# Patient Record
Sex: FEMALE | Race: ASIAN | NOT HISPANIC OR LATINO | Employment: UNEMPLOYED | URBAN - METROPOLITAN AREA
[De-identification: names, ages, dates, MRNs, and addresses within clinical notes are randomized per-mention and may not be internally consistent; named-entity substitution may affect disease eponyms.]

---

## 2018-11-06 NOTE — PATIENT DISCUSSION
Based on today’s exam, diagnostic studies, and/or review of records, the determination was to see a retinal specialist for an eval.

## 2018-11-07 NOTE — PATIENT DISCUSSION
Lucentis 0.5mg injection recommended today after discussion of benefits, risks, alternatives. The injection was administered without complication. Post-injection instructions were reviewed and understood by the patient.

## 2018-11-07 NOTE — PATIENT DISCUSSION
Decision to treat was made based on today's clinical findings.  SRF present on testing/exam today.  Recommend Lucentis 0.5mg injection today.  Follow up in 4-5 weeks for repeat injection.

## 2018-11-07 NOTE — PROCEDURE NOTE: CLINICAL
PROCEDURE NOTE: Lucentis 0.5 mg OS. Diagnosis: Neovascular AMD with Active CNV. Anesthesia: Topical. Prep: Betadine Drops and Scrubs. Prior to injection, risks/benefits/alternatives discussed including infection, loss of vision, hemorrhage, cataract, glaucoma, retinal tears or detachment and patient wished to proceed. Informed consent obtained. . Patient was advised the purpose of the treatment was to slow the progression of the disease, and may not improve visual acuity. Betadine prep was performed. Injection site: 3-4 mm from the limbus. Mask worn during procedure. A lid speculum was used. Intravitreal injection of Lucentis 0.5mg/0.05 ml was given. Discarded remaining *. CRA perfusion confirmed. The eye was irrigated with sterile eye rinse solution. The betadine was washed away. Count fingers vision was verified. The patient tolerated the procedure well and there were no complications from the procedure. Post procedure instructions given. Patient given office phone number/answering service number and advised to call immediately should there be an increase in floaters or redness, loss of vision or pain, or should they have any other questions or concerns. Patient was given the standard instruction sheet. Cecil Escobedo

## 2018-12-10 NOTE — PROCEDURE NOTE: CLINICAL
PROCEDURE NOTE: Lucentis 0.5 mg OS. Diagnosis: Neovascular AMD with Active CNV. Prior to injection, risks/benefits/alternatives discussed including infection, loss of vision, hemorrhage, cataract, glaucoma, retinal tears or detachment and patient wished to proceed. Informed consent obtained. . Patient was advised the purpose of the treatment was to slow the progression of the disease, and may not improve visual acuity. Betadine prep was performed. Injection site: 3-4 mm from the limbus. Mask worn during procedure. A lid speculum was used. Intravitreal injection of Lucentis 0.5mg/0.05 ml was given. Discarded remaining *. CRA perfusion confirmed. The eye was irrigated with sterile eye rinse solution. The betadine was washed away. Count fingers vision was verified. The patient tolerated the procedure well and there were no complications from the procedure. Post procedure instructions given. Patient given office phone number/answering service number and advised to call immediately should there be an increase in floaters or redness, loss of vision or pain, or should they have any other questions or concerns. Patient was given the standard instruction sheet. Melinda Bhakta

## 2018-12-10 NOTE — PATIENT DISCUSSION
Decision to treat was made based on today's clinical findings.  Decreased SRF present on testing/exam today, Improved.  Recommend Lucentis 0.5mg injection today.  Follow up in one month for possible repeat injection.

## 2019-01-18 NOTE — PROCEDURE NOTE: CLINICAL
PROCEDURE NOTE: Lucentis 0.5 mg OS. Diagnosis: Neovascular AMD with Active CNV. Prior to injection, risks/benefits/alternatives discussed including infection, loss of vision, hemorrhage, cataract, glaucoma, retinal tears or detachment and patient wished to proceed. Informed consent obtained. . Patient was advised the purpose of the treatment was to slow the progression of the disease, and may not improve visual acuity. Betadine prep was performed. Injection site: 3-4 mm from the limbus. Mask worn during procedure. A lid speculum was used. Intravitreal injection of Lucentis 0.5mg/0.05 ml was given. Discarded remaining *. CRA perfusion confirmed. The eye was irrigated with sterile eye rinse solution. The betadine was washed away. Count fingers vision was verified. The patient tolerated the procedure well and there were no complications from the procedure. Post procedure instructions given. Patient given office phone number/answering service number and advised to call immediately should there be an increase in floaters or redness, loss of vision or pain, or should they have any other questions or concerns. Patient was given the standard instruction sheet. Gavin Damon

## 2019-01-18 NOTE — PATIENT DISCUSSION
Decision to treat was made based on today's clinical findings.  Decreased SRF, present on testing/exam today, Improved.  Recommend Lucentis 0.5mg injection today. Extend to 6-7 weeks for possible repeat injection.

## 2019-03-01 NOTE — PROCEDURE NOTE: CLINICAL
PROCEDURE NOTE: Lucentis 0.5 mg OS. Diagnosis: Neovascular AMD with Active CNV. Prior to injection, risks/benefits/alternatives discussed including infection, loss of vision, hemorrhage, cataract, glaucoma, retinal tears or detachment and patient wished to proceed. Informed consent obtained. . Patient was advised the purpose of the treatment was to slow the progression of the disease, and may not improve visual acuity. Betadine prep was performed. Injection site: 3-4 mm from the limbus. Mask worn during procedure. A lid speculum was used. Intravitreal injection of Lucentis 0.5mg/0.05 ml was given. Discarded remaining *. CRA perfusion confirmed. The eye was irrigated with sterile eye rinse solution. The betadine was washed away. Count fingers vision was verified. The patient tolerated the procedure well and there were no complications from the procedure. Post procedure instructions given. Patient given office phone number/answering service number and advised to call immediately should there be an increase in floaters or redness, loss of vision or pain, or should they have any other questions or concerns. Patient was given the standard instruction sheet. Mary Jo Wilde

## 2019-03-01 NOTE — PATIENT DISCUSSION
Decision to treat was made based on today's clinical findings.  Decreased SRF, present on testing/exam today, Improved.  Recommend Lucentis 0.5mg injection today. Patient to return in 5-6 weeks, possible repeat injection at that time.

## 2019-04-05 NOTE — PATIENT DISCUSSION
Decision to treat was made based on today's clinical findings.  Ped with Decreased SRF, present on testing/exam today, Improved.  Recommend Lucentis 0.5mg injection today.  Treat and extend, patient to return in 6-7 weeks, possible repeat injection at that time.

## 2019-04-05 NOTE — PROCEDURE NOTE: CLINICAL
PROCEDURE NOTE: Lucentis 0.5 mg OS. Diagnosis: Neovascular AMD with Active CNV. Prior to injection, risks/benefits/alternatives discussed including infection, loss of vision, hemorrhage, cataract, glaucoma, retinal tears or detachment and patient wished to proceed. Informed consent obtained. . Patient was advised the purpose of the treatment was to slow the progression of the disease, and may not improve visual acuity. Betadine prep was performed. Injection site: 3-4 mm from the limbus. Mask worn during procedure. A lid speculum was used. Intravitreal injection of Lucentis 0.5mg/0.05 ml was given. Discarded remaining *. CRA perfusion confirmed. The eye was irrigated with sterile eye rinse solution. The betadine was washed away. Count fingers vision was verified. The patient tolerated the procedure well and there were no complications from the procedure. Post procedure instructions given. Patient given office phone number/answering service number and advised to call immediately should there be an increase in floaters or redness, loss of vision or pain, or should they have any other questions or concerns. Patient was given the standard instruction sheet. Danis Webb

## 2019-05-22 NOTE — PROCEDURE NOTE: CLINICAL
PROCEDURE NOTE: Lucentis 0.5 mg OS. Diagnosis: Neovascular AMD with Active CNV. Prior to injection, risks/benefits/alternatives discussed including infection, loss of vision, hemorrhage, cataract, glaucoma, retinal tears or detachment and patient wished to proceed. Informed consent obtained. . Patient was advised the purpose of the treatment was to slow the progression of the disease, and may not improve visual acuity. Betadine prep was performed. Injection site: 3-4 mm from the limbus. Mask worn during procedure. A lid speculum was used. Intravitreal injection of Lucentis 0.5mg/0.05 ml was given. Discarded remaining *. CRA perfusion confirmed. The eye was irrigated with sterile eye rinse solution. The betadine was washed away. Count fingers vision was verified. The patient tolerated the procedure well and there were no complications from the procedure. Post procedure instructions given. Patient given office phone number/answering service number and advised to call immediately should there be an increase in floaters or redness, loss of vision or pain, or should they have any other questions or concerns. Patient was given the standard instruction sheet. Nino Russo

## 2019-05-22 NOTE — PATIENT DISCUSSION
Decision to treat was made based on today's clinical findings.  Increased IRF, worse.  Continue with injection today, decrease interval to  5-6 weeks for possible repeat injection.

## 2019-06-26 NOTE — PROCEDURE NOTE: CLINICAL
PROCEDURE NOTE: Lucentis 0.5 mg OS. Diagnosis: Neovascular AMD with Active CNV. Prior to injection, risks/benefits/alternatives discussed including infection, loss of vision, hemorrhage, cataract, glaucoma, retinal tears or detachment and patient wished to proceed. Informed consent obtained. . Patient was advised the purpose of the treatment was to slow the progression of the disease, and may not improve visual acuity. Betadine prep was performed. Injection site: 3-4 mm from the limbus. Mask worn during procedure. A lid speculum was used. Intravitreal injection of Lucentis 0.5mg/0.05 ml was given. Discarded remaining *. CRA perfusion confirmed. The eye was irrigated with sterile eye rinse solution. The betadine was washed away. Count fingers vision was verified. The patient tolerated the procedure well and there were no complications from the procedure. Post procedure instructions given. Patient given office phone number/answering service number and advised to call immediately should there be an increase in floaters or redness, loss of vision or pain, or should they have any other questions or concerns. Patient was given the standard instruction sheet. Shital Caruso

## 2019-06-26 NOTE — PATIENT DISCUSSION
Discussed in detail re: nature of condition, dry vs wet AMD, AREDS.  Decreased PED, improved. No evidence of wet conversion.

## 2019-06-26 NOTE — PATIENT DISCUSSION
Decision to treat was made based on today's clinical findings. Decreased IRF, improved.  Continue with injection today, patient to return in  5-6 weeks for possible repeat injection.

## 2019-08-12 NOTE — PATIENT DISCUSSION
Discussed in detail re: nature of condition, dry vs wet AMD, AREDS. PED, no SRF/IRF. No evidence of wet conversion.

## 2019-08-12 NOTE — PATIENT DISCUSSION
Decision to treat was made based on today's clinical findings. Increased IRF, worse.  Continue with injection today, decrease interval to  5 weeks for possible repeat injection.

## 2019-08-12 NOTE — PROCEDURE NOTE: CLINICAL
PROCEDURE NOTE: Lucentis 0.5 mg OS. Diagnosis: Neovascular AMD with Active CNV. Prior to injection, risks/benefits/alternatives discussed including infection, loss of vision, hemorrhage, cataract, glaucoma, retinal tears or detachment and patient wished to proceed. Informed consent obtained. . Patient was advised the purpose of the treatment was to slow the progression of the disease, and may not improve visual acuity. Betadine prep was performed. Injection site: 3-4 mm from the limbus. Mask worn during procedure. A lid speculum was used. Intravitreal injection of Lucentis 0.5mg/0.05 ml was given. Discarded remaining *. CRA perfusion confirmed. The eye was irrigated with sterile eye rinse solution. The betadine was washed away. Count fingers vision was verified. The patient tolerated the procedure well and there were no complications from the procedure. Post procedure instructions given. Patient given office phone number/answering service number and advised to call immediately should there be an increase in floaters or redness, loss of vision or pain, or should they have any other questions or concerns. Patient was given the standard instruction sheet. Roxy Dove

## 2019-09-16 NOTE — PATIENT DISCUSSION
Discussed in detail re: nature of condition, dry vs wet AMD, AREDS. Increased Atrophy/PED. No evidence of wet conversion.

## 2019-09-16 NOTE — PROCEDURE NOTE: CLINICAL
PROCEDURE NOTE: Eylea 2mg OS. Diagnosis: Neovascular AMD with Active CNV. Prior to injection, risks/benefits/alternatives discussed including corneal abrasion, infection, loss of vision, hemorrhage, cataract, glaucoma, retinal tears or detachment. A written consent is on file, and the need for today’s injection was discussed and the patient is understanding and wishes to proceed. The entire vial of Eylea was drawn up into a syringe. The opened vial, remaining drug, and filtered needle were disposed of in a certified biohazard container. Betadine prep was performed. Topical anesthesia was induced with Alcaine. 4% lidocaine pledge. A lid speculum was used. A short 30g needle on a 1cc syringe was used with product that that had previously been prepared under sterile conditions. Injection site: 3-4 mm from the limbus. The used syringe/needle was transferred to a biohazard container. Lid speculum removed. Mask worn during procedure. Patient tolerated procedure well. Count fingers vision was verified. There were no complications. Patient was given the standard instruction sheet. Patient given office phone number/answering service number and advised to call immediately should there be loss of vision or pain, or should they have any other questions or concerns. Bob Moore

## 2019-09-16 NOTE — PATIENT DISCUSSION
Decision to treat was made based on today's clinical findings. Increased PED/SRF, worse, will switch to [facility] today.

## 2019-10-21 NOTE — PATIENT DISCUSSION
No Evidence of WET AMD, No Progression, No Fluid. Discussed in detail re: nature of condition, dry vs wet AMD, AREDS.

## 2019-10-21 NOTE — PROCEDURE NOTE: CLINICAL
PROCEDURE NOTE: Eylea 2mg OS. Diagnosis: Neovascular AMD with Active CNV. Prior to injection, risks/benefits/alternatives discussed including corneal abrasion, infection, loss of vision, hemorrhage, cataract, glaucoma, retinal tears or detachment. A written consent is on file, and the need for today’s injection was discussed and the patient is understanding and wishes to proceed. The entire vial of Eylea was drawn up into a syringe. The opened vial, remaining drug, and filtered needle were disposed of in a certified biohazard container. Betadine prep was performed. Topical anesthesia was induced with Alcaine. 4% lidocaine pledge. A lid speculum was used. A short 30g needle on a 1cc syringe was used with product that that had previously been prepared under sterile conditions. Injection site: 3-4 mm from the limbus. The used syringe/needle was transferred to a biohazard container. Lid speculum removed. Mask worn during procedure. Patient tolerated procedure well. Count fingers vision was verified. There were no complications. Patient was given the standard instruction sheet. Patient given office phone number/answering service number and advised to call immediately should there be loss of vision or pain, or should they have any other questions or concerns. Debby Begum

## 2019-11-22 NOTE — PATIENT DISCUSSION
No Evidence of WET AMD, No Progression, No Fluid. Discussed in detail re: nature of condition, dry vs wet AMD, AREDS.  No progression to wet, ped, no fluid.

## 2019-11-22 NOTE — PATIENT DISCUSSION
Decision to treat was made based on today's clinical findings.  Decreased PED, no fluid, improved. Extend to 6 weeks.

## 2019-11-22 NOTE — PROCEDURE NOTE: CLINICAL
PROCEDURE NOTE: Eylea 2mg OS. Diagnosis: Neovascular AMD with Active CNV. Prior to injection, risks/benefits/alternatives discussed including corneal abrasion, infection, loss of vision, hemorrhage, cataract, glaucoma, retinal tears or detachment. A written consent is on file, and the need for today’s injection was discussed and the patient is understanding and wishes to proceed. The entire vial of Eylea was drawn up into a syringe. The opened vial, remaining drug, and filtered needle were disposed of in a certified biohazard container. Betadine prep was performed. Topical anesthesia was induced with Alcaine. 4% lidocaine pledge. A lid speculum was used. A short 30g needle on a 1cc syringe was used with product that that had previously been prepared under sterile conditions. Injection site: 3-4 mm from the limbus. The used syringe/needle was transferred to a biohazard container. Lid speculum removed. Mask worn during procedure. Patient tolerated procedure well. Count fingers vision was verified. There were no complications. Patient was given the standard instruction sheet. Patient given office phone number/answering service number and advised to call immediately should there be loss of vision or pain, or should they have any other questions or concerns. Robb Trinh

## 2019-12-02 NOTE — PATIENT DISCUSSION
History of Scleritis/Episcleritis OU,  Resolving OD,  No pain OD.  Work up negative.  Last episode was in 2012.  Follow up in 2 weeks.

## 2019-12-16 NOTE — PATIENT DISCUSSION
Resolved, History of Scleritis/Episcleritis OU,  No pain OD.  Work up negative.  Last episode was in 2012.

## 2020-01-06 NOTE — PATIENT DISCUSSION
Decreased PED, improved. Eylea injection recommended today after discussion of benefits, risks, alternatives, and off-label use. The injection was administered without complication. Post-injection instructions were reviewed and understood by the patient.

## 2020-01-06 NOTE — PROCEDURE NOTE: CLINICAL
PROCEDURE NOTE: Eylea 2mg OS. Diagnosis: Neovascular AMD with Active CNV. Prior to injection, risks/benefits/alternatives discussed including corneal abrasion, infection, loss of vision, hemorrhage, cataract, glaucoma, retinal tears or detachment. A written consent is on file, and the need for today’s injection was discussed and the patient is understanding and wishes to proceed. The entire vial of Eylea was drawn up into a syringe. The opened vial, remaining drug, and filtered needle were disposed of in a certified biohazard container. Betadine prep was performed. Topical anesthesia was induced with Alcaine. 4% lidocaine pledge. A lid speculum was used. A short 30g needle on a 1cc syringe was used with product that that had previously been prepared under sterile conditions. Injection site: 3-4 mm from the limbus. The used syringe/needle was transferred to a biohazard container. Lid speculum removed. Mask worn during procedure. Patient tolerated procedure well. Count fingers vision was verified. There were no complications. Patient was given the standard instruction sheet. Patient given office phone number/answering service number and advised to call immediately should there be loss of vision or pain, or should they have any other questions or concerns. Christiano Cook

## 2020-02-24 NOTE — PROCEDURE NOTE: CLINICAL
PROCEDURE NOTE: Eylea Prefilled Syringe 2mg OS. Diagnosis: Neovascular AMD with Active CNV. Prior to injection, risks/benefits/alternatives discussed including corneal abrasion, infection, loss of vision, hemorrhage, cataract, glaucoma, retinal tears or detachment. A written consent is on file, and the need for today's injection was discussed and the patient is understanding and wishes to proceed. A 30G needle was placed on an Eylea 2mg/0.05ml Pre-filled Syringe. Betadine prep was performed. Topical anesthesia was induced with Alcaine. 4% lidocaine pledge. A lid speculum was used. An intravitreal injection of Eylea was given. Injection site: 3-4 mm from the limbus. The used syringe/needle was transferred to a biohazard container. Lid speculum removed. Mask worn during procedure. Patient tolerated procedure well. Count fingers vision was verified. There were no complications. Patient was given the standard instruction sheet. Tanisha Castaneda

## 2020-02-24 NOTE — PATIENT DISCUSSION
Discussed in detail re: nature of condition, dry vs wet AMD, AREDS.  Increased PED, no conversion to wet.

## 2020-02-24 NOTE — PATIENT DISCUSSION
Decision to treat was made based on today's clinical findings.  Increased PED, no fluid, worse, continue with injection today.

## 2020-04-07 NOTE — PATIENT DISCUSSION
Discussed in detail re: nature of condition, dry vs wet AMD, AREDS.  Decreased PED, no conversion to wet.

## 2020-04-07 NOTE — PROCEDURE NOTE: CLINICAL
PROCEDURE NOTE: Eylea Prefilled Syringe 2mg OS. Diagnosis: Neovascular AMD with Active CNV. Prior to injection, risks/benefits/alternatives discussed including corneal abrasion, infection, loss of vision, hemorrhage, cataract, glaucoma, retinal tears or detachment. A written consent is on file, and the need for today's injection was discussed and the patient is understanding and wishes to proceed. A 30G needle was placed on an Eylea 2mg/0.05ml Pre-filled Syringe. Betadine prep was performed. Topical anesthesia was induced with Alcaine. 4% lidocaine pledge. A lid speculum was used. An intravitreal injection of Eylea was given. Injection site: 3-4 mm from the limbus. The used syringe/needle was transferred to a biohazard container. Lid speculum removed. Mask worn during procedure. Patient tolerated procedure well. Count fingers vision was verified. There were no complications. Patient was given the standard instruction sheet. Jackie Moss

## 2020-05-29 NOTE — PROCEDURE NOTE: CLINICAL
PROCEDURE NOTE: Eylea Prefilled Syringe 2mg OS. Diagnosis: Neovascular AMD with Active CNV. Prior to injection, risks/benefits/alternatives discussed including corneal abrasion, infection, loss of vision, hemorrhage, cataract, glaucoma, retinal tears or detachment. A written consent is on file, and the need for today's injection was discussed and the patient is understanding and wishes to proceed. A 30G needle was placed on an Eylea 2mg/0.05ml Pre-filled Syringe. Betadine prep was performed. Topical anesthesia was induced with Alcaine. 4% lidocaine pledge. A lid speculum was used. An intravitreal injection of Eylea was given. Injection site: 3-4 mm from the limbus. The used syringe/needle was transferred to a biohazard container. Lid speculum removed. Mask worn during procedure. Patient tolerated procedure well. Count fingers vision was verified. There were no complications. Patient was given the standard instruction sheet. Robb Trinh

## 2020-07-30 ENCOUNTER — NEW PATIENT COMPREHENSIVE (OUTPATIENT)
Dept: URBAN - METROPOLITAN AREA CLINIC 46 | Facility: CLINIC | Age: 45
End: 2020-07-30

## 2020-07-30 DIAGNOSIS — E11.9: ICD-10-CM

## 2020-07-30 PROCEDURE — 92004 COMPRE OPH EXAM NEW PT 1/>: CPT

## 2020-07-30 PROCEDURE — 92015 DETERMINE REFRACTIVE STATE: CPT

## 2020-07-30 ASSESSMENT — VISUAL ACUITY
OD_CC: 20/25
OD_SC: 20/50
OS_CC: J2
OD_CC: J2
OS_SC: 20/40-1

## 2020-07-30 ASSESSMENT — TONOMETRY
OD_IOP_MMHG: 14
OS_IOP_MMHG: 16

## 2020-07-31 NOTE — PROCEDURE NOTE: CLINICAL
PROCEDURE NOTE: Eylea Prefilled Syringe 2mg OS. Diagnosis: Neovascular AMD with Active CNV. Prior to injection, risks/benefits/alternatives discussed including corneal abrasion, infection, loss of vision, hemorrhage, cataract, glaucoma, retinal tears or detachment. A written consent is on file, and the need for today's injection was discussed and the patient is understanding and wishes to proceed. A 30G needle was placed on an Eylea 2mg/0.05ml Pre-filled Syringe. Betadine prep was performed. Topical anesthesia was induced with Alcaine. 4% lidocaine pledge. A lid speculum was used. An intravitreal injection of Eylea was given. Injection site: 3-4 mm from the limbus. The used syringe/needle was transferred to a biohazard container. Lid speculum removed. Mask worn during procedure. Patient tolerated procedure well. Count fingers vision was verified. There were no complications. Patient was given the standard instruction sheet. Yoly Carney

## 2020-07-31 NOTE — PATIENT DISCUSSION
Decision to treat was made based on today's clinical findings.  Slight Decreased PED,No fluid, Improved.

## 2020-10-02 NOTE — PROCEDURE NOTE: CLINICAL
PROCEDURE NOTE: Eylea Prefilled Syringe 2mg OS. Diagnosis: Neovascular AMD with Active CNV. Prior to injection, risks/benefits/alternatives discussed including corneal abrasion, infection, loss of vision, hemorrhage, cataract, glaucoma, retinal tears or detachment. A written consent is on file, and the need for today's injection was discussed and the patient is understanding and wishes to proceed. A 30G needle was placed on an Eylea 2mg/0.05ml Pre-filled Syringe. Betadine prep was performed. Topical anesthesia was induced with Alcaine. 4% lidocaine pledge. A lid speculum was used. An intravitreal injection of Eylea was given. Injection site: 3-4 mm from the limbus. The used syringe/needle was transferred to a biohazard container. Lid speculum removed. Mask worn during procedure. Patient tolerated procedure well. Count fingers vision was verified. There were no complications. Patient was given the standard instruction sheet. Moise Ferguson

## 2020-12-04 NOTE — PROCEDURE NOTE: CLINICAL

## 2021-02-12 NOTE — PROCEDURE NOTE: CLINICAL

## 2021-04-02 NOTE — PROCEDURE NOTE: CLINICAL

## 2021-06-04 NOTE — PATIENT DISCUSSION
[place]ler grid at home. MVI/AREDS discussed. Patient to call if any changes in vision or grid card. Declines

## 2021-06-04 NOTE — PROCEDURE NOTE: CLINICAL

## 2021-07-30 NOTE — PROCEDURE NOTE: CLINICAL

## 2021-09-24 NOTE — PROCEDURE NOTE: CLINICAL

## 2021-12-03 NOTE — PROCEDURE NOTE: CLINICAL

## 2022-02-04 NOTE — PROCEDURE NOTE: CLINICAL

## 2022-03-16 NOTE — PATIENT DISCUSSION
9/16/19 Retina Poss GILMER OS q 5 weeks. What Type Of Note Output Would You Prefer (Optional)?: Bullet Format How Severe Are Your Spot(S)?: mild Have Your Spot(S) Been Treated In The Past?: has not been treated Hpi Title: Evaluation of Skin Lesions

## 2022-04-08 NOTE — PROCEDURE NOTE: CLINICAL

## 2022-06-10 NOTE — PATIENT DISCUSSION
An examination that was significantly and separately identifiable from the procedure performed today was also completed for this Dry Eye Syndrome.

## 2022-06-10 NOTE — PROCEDURE NOTE: CLINICAL

## 2022-08-19 NOTE — PATIENT DISCUSSION
Recommended Eylea injection today. Sub Conj Lido was injected prior to injection The injection was given and tolerated well by patient. Post-injection instructions were reviewed and understood by the patient.

## 2022-08-19 NOTE — PROCEDURE NOTE: CLINICAL

## 2024-10-29 NOTE — PATIENT DISCUSSION
Recommended Eylea injection today. The injection was given and tolerated well by patient. Post-injection instructions were reviewed and understood by the patient. electronic